# Patient Record
Sex: FEMALE | Race: OTHER
[De-identification: names, ages, dates, MRNs, and addresses within clinical notes are randomized per-mention and may not be internally consistent; named-entity substitution may affect disease eponyms.]

---

## 2019-03-05 PROBLEM — Z00.00 ENCOUNTER FOR PREVENTIVE HEALTH EXAMINATION: Status: ACTIVE | Noted: 2019-03-05

## 2019-03-07 ENCOUNTER — APPOINTMENT (OUTPATIENT)
Dept: OTOLARYNGOLOGY | Facility: CLINIC | Age: 74
End: 2019-03-07

## 2019-03-22 ENCOUNTER — APPOINTMENT (OUTPATIENT)
Dept: OTOLARYNGOLOGY | Facility: CLINIC | Age: 74
End: 2019-03-22
Payer: SELF-PAY

## 2019-03-22 PROCEDURE — V5299A: CUSTOM | Mod: NC

## 2019-09-05 ENCOUNTER — APPOINTMENT (OUTPATIENT)
Dept: OTOLARYNGOLOGY | Facility: CLINIC | Age: 74
End: 2019-09-05

## 2019-09-16 ENCOUNTER — APPOINTMENT (OUTPATIENT)
Dept: OTOLARYNGOLOGY | Facility: CLINIC | Age: 74
End: 2019-09-16
Payer: SELF-PAY

## 2019-09-16 PROCEDURE — 92593: CPT | Mod: NC

## 2019-10-04 ENCOUNTER — APPOINTMENT (OUTPATIENT)
Dept: OTOLARYNGOLOGY | Facility: CLINIC | Age: 74
End: 2019-10-04
Payer: COMMERCIAL

## 2019-10-04 VITALS
BODY MASS INDEX: 28.87 KG/M2 | WEIGHT: 165 LBS | DIASTOLIC BLOOD PRESSURE: 81 MMHG | SYSTOLIC BLOOD PRESSURE: 112 MMHG | HEIGHT: 63.5 IN | HEART RATE: 77 BPM

## 2019-10-04 DIAGNOSIS — Z82.49 FAMILY HISTORY OF ISCHEMIC HEART DISEASE AND OTHER DISEASES OF THE CIRCULATORY SYSTEM: ICD-10-CM

## 2019-10-04 DIAGNOSIS — Z78.9 OTHER SPECIFIED HEALTH STATUS: ICD-10-CM

## 2019-10-04 DIAGNOSIS — H61.23 IMPACTED CERUMEN, BILATERAL: ICD-10-CM

## 2019-10-04 DIAGNOSIS — Z87.891 PERSONAL HISTORY OF NICOTINE DEPENDENCE: ICD-10-CM

## 2019-10-04 DIAGNOSIS — Z87.09 PERSONAL HISTORY OF OTHER DISEASES OF THE RESPIRATORY SYSTEM: ICD-10-CM

## 2019-10-04 DIAGNOSIS — Z82.3 FAMILY HISTORY OF STROKE: ICD-10-CM

## 2019-10-04 PROCEDURE — V5010 ASSESSMENT FOR HEARING AID: CPT | Mod: NC

## 2019-10-04 PROCEDURE — 92557 COMPREHENSIVE HEARING TEST: CPT

## 2019-10-04 PROCEDURE — 99213 OFFICE O/P EST LOW 20 MIN: CPT | Mod: 25

## 2019-10-04 PROCEDURE — 69210 REMOVE IMPACTED EAR WAX UNI: CPT

## 2019-10-04 PROCEDURE — 92567 TYMPANOMETRY: CPT

## 2019-10-04 RX ORDER — LOSARTAN POTASSIUM 100 MG/1
TABLET, FILM COATED ORAL
Refills: 0 | Status: ACTIVE | COMMUNITY

## 2019-10-04 RX ORDER — ATORVASTATIN CALCIUM 80 MG/1
TABLET, FILM COATED ORAL
Refills: 0 | Status: ACTIVE | COMMUNITY

## 2019-10-04 NOTE — ASSESSMENT
[FreeTextEntry1] : It was my impression that the patient had a cerumen impaction that was cleared.  I recommended topical moisturizing.  I recommended avoiding Q-tips.  I reviewed aural hygiene and the role of cerumen with the patient.  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

## 2019-10-04 NOTE — HISTORY OF PRESENT ILLNESS
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 Patient reports of decreased in hearing, even while using bilateral amplification. Patient complains of some intermittent ear itching. No Other ENT issues reported at this visit.

## 2019-10-04 NOTE — REVIEW OF SYSTEMS
[Seasonal Allergies] : seasonal allergies [Red Eyes] : red eyes [Ear Itch] : ear itch [Eyes Itch] : itching of the eyes [Heartburn] : heartburn [Negative] : Endocrine

## 2019-10-04 NOTE — CONSULT LETTER
Message   Recorded as Task   Date: 08/26/2016 02:36 PM, Created By: [address]   Task Name: Care Coordination   Assigned To: [address] [facility] [facility] Clinical,Team   Regarding Patient: Bing Jerez, Status: In Progress   Comment:    Dipak Celaya - 26 Aug 2016 2:36 PM     TASK CREATED  8/22/16 UDT showed presence of alcohol  This is an opioid treatment agreement violation  Please remind patient not to drink alcohol while taking opioid medications  Any further opioid treatment agreement violations will result in me no longer prescribing those medications for him  So Hernandez - 29 Aug 2016 9:04 AM     TASK EDITED       Lmom for pt to return call  So Hernandez - 30 Aug 2016 11:45 AM     TASK EDITED  Called pt back informed him I was calling regarding his UDS  Pt states that soemthing is wrong because he does not drink alcohol  He is willing to return to the office to give another sample  Pt adamant that he does not drink alcohol  Pt lmom stating everything is fine from his injection,don't bother calling back  [address] - 06 Sep 2016 7:40 AM     TASK REPLIED TO: Previously Assigned To Autumn Heaton - 06 Sep 2016 7:44 AM     TASK IN PROGRESS        Active Problems    1  Analgesic use (V58 69) (Z79 899)   2  Chronic low back pain (724 2,338 29) (M54 5,G89 29)   3  Chronic obstructive pulmonary disease (496) (J44 9)   4  Chronic pain syndrome (338 4) (G89 4)   5  Cigarette nicotine dependence with nicotine-induced disorder (292 9) (F17 219)   6  Dyspnea on exertion (786 09) (R06 09)   7  Hiatal hernia (553 3) (K44 9)   8  Hyperlipidemia (272 4) (E78 5)   9  Hypertension (401 9) (I10)   10  Hypoxemia requiring supplemental oxygen (799 02) (R09 02,Z99 81)   11  Lumbar facet arthropathy (721 3) (M12 88)   12  Morbid or severe obesity due to excess calories (278 01) (E66 01)   13  Nocturnal hypoxemia (327 24)   14  Obstructive sleep apnea (327 23) (G47 33)   15  Pleural effusion (511 9) (J90)   16  Sacroiliitis (720 2) (M46 1)    Current Meds   1  Albuterol Sulfate (2 5 MG/3ML) 0 083% Inhalation Nebulization Solution; USE 1 UNIT   DOSE IN NEBULIZER 4 TIMES DAILY; Therapy: 53NNT7472 to (Evaluate:30Jan2017)  Requested for: 00Myb6577; Last   Rx:[id number] Ordered   2  Aspirin Low Dose 81 MG TABS; Therapy: (Recorded:12Gkz4910) to Recorded   3  Bystolic 10 MG Oral Tablet; Therapy: (Recorded:93Eie9325) to Recorded   4  Combivent AERO; Therapy: (([phone]) to Recorded   5  Diltiazem HCl 240 MG CP24;   Therapy: (Recorded:24Mls6492) to Recorded   6  Flovent  MCG/ACT Inhalation Aerosol; INHALE 2 PUFFS Every twelve hours; Therapy: 15MTB1591 to (Evaluate:10Feb2017)  Requested for: 18DLA9154; Last   Rx:99Uli7468 Ordered   7  Hydrocodone-Acetaminophen  MG Oral Tablet (Norco); TAKE 1 TABLET 3 times   daily PRN pain; Therapy: 27BKI9999 to (Evaluate:62Dsd3680); Last Rx:69Deq6118 Ordered   8  Incruse Ellipta 62 5 MCG/INH Inhalation Aerosol Powder Breath Activated; Therapy: (Recorded:90Rml7485) to Recorded   9  Lipitor 40 MG Oral Tablet (Atorvastatin Calcium); Therapy: (733 162 319) to Recorded   10  Omeprazole 20 MG Oral Capsule Delayed Release; Therapy: 91LDO6382 to Recorded   11  Plavix 75 MG Oral Tablet (Clopidogrel Bisulfate); Therapy: (75 196 772) to Recorded   12  Spiriva HandiHaler 18 MCG Inhalation Capsule; INHALE 1 CAPSULE Daily; Therapy: 73SYM8681 to (Evaluate:11Aug2017)  Requested for: 65Qji8249; Last    Rx:36Xjc7819 Ordered   13  Tamsulosin HCl 0 4 MG CAPS; Therapy: (75 196 772) to Recorded   14  Xarelto 20 MG Oral Tablet; Therapy: (Recorded:51Mds0864) to Recorded    Allergies    1   No Known Drug Allergies    Signatures   Electronically signed by : Dionne Martinez RN; Sep  6 2016  7:45AM EST                       (Author) [FreeTextEntry2] : KIEL DELANEY\par  [FreeTextEntry1] : \par \par Dear  Dr. KIEL DELANEY,\par \par I had the pleasure of seeing your patient today.  \par Please see my note below.\par \par \par Thank you very much for allowing me to participate in the care of your patient.\par \par Sincerely,\par \par \par Stef Kwong MD\par [facility]\par [facility]\par  [facility]\par \par

## 2019-10-04 NOTE — PHYSICAL EXAM
[FreeTextEntry1] : \par The patient was alert and oriented and in no distress.\par Voice was clear.\par \par Face:\par The patient had no facial asymmetry or mass.\par The skin was unremarkable.\par \par Eyes:\par The pupils were equal round and reactive to light and accommodation.\par There was no significant nystagmus or disconjugate gaze noted.\par \par Nose: \par The external nose had no significant deformity.  There was no facial tenderness.  On anterior rhinoscopy, the nasal mucosa was clear.  The anterior septum was deflected sharply to the right.  There were no visualized polyps purulence  or masses.\par \par Oral cavity:\par The oral mucosa was normal.\par The oral and base of tongue were clear and without mass.\par The gingival and buccal mucosa were moist and without lesions.\par The palate moved well.\par There was no cleft to the palate.\par There appeared to be good salivary flow.  \par There was no pus, erythema or mass in the oral cavity.\par \par \par Ears:\par  Procedure note:\par  Removal of Cerumen Impactions, both ears:  80510-46\par Both external ears were normal.\par There were symptomatic cerumen impactions in both ears.  These were cleared microscopically without trauma using both suction and curettes.  After clearing,  both ear canals were clear both eardrums were intact and mobile.  She had dry flaking skin of the ear canals\par Neck: \par The neck was symmetrical.\par The parotid and submandibular glands were normal without masses.\par The trachea was midline and there was no unusual crepitus.\par The thyroid was smooth and nontender and no masses were palpated.\par There was no significant cervical adenopathy.\par \par \par Neuro:\par Neurologically, the patient was awake, alert, and oriented to person, place and time. There were no obvious focal neurologic abnormalities.  Cranial nerves II through XII were grossly intact.\par \par \par TMJ:\par The temporomandibular joints were nontender.\par There was no abnormal crepitus and no significant malocclusion\par

## 2019-10-23 ENCOUNTER — APPOINTMENT (OUTPATIENT)
Dept: OTOLARYNGOLOGY | Facility: CLINIC | Age: 74
End: 2019-10-23
Payer: SELF-PAY

## 2019-10-23 DIAGNOSIS — Z46.1 ENCOUNTER FOR FITTING AND ADJUSTMENT OF HEARING AID: ICD-10-CM

## 2019-10-23 PROCEDURE — V5254I: CUSTOM

## 2019-11-14 ENCOUNTER — APPOINTMENT (OUTPATIENT)
Dept: OTOLARYNGOLOGY | Facility: CLINIC | Age: 74
End: 2019-11-14
Payer: SELF-PAY

## 2019-11-14 PROCEDURE — 92593: CPT | Mod: NC

## 2020-05-13 ENCOUNTER — APPOINTMENT (OUTPATIENT)
Dept: OTOLARYNGOLOGY | Facility: CLINIC | Age: 75
End: 2020-05-13

## 2020-07-01 ENCOUNTER — APPOINTMENT (OUTPATIENT)
Dept: OTOLARYNGOLOGY | Facility: CLINIC | Age: 75
End: 2020-07-01
Payer: SELF-PAY

## 2020-07-01 PROCEDURE — 92593: CPT | Mod: NC

## 2020-07-20 ENCOUNTER — APPOINTMENT (OUTPATIENT)
Dept: OTOLARYNGOLOGY | Facility: CLINIC | Age: 75
End: 2020-07-20
Payer: SELF-PAY

## 2020-07-20 PROCEDURE — 92593: CPT | Mod: NC

## 2021-01-12 ENCOUNTER — EMERGENCY (EMERGENCY)
Facility: HOSPITAL | Age: 76
LOS: 1 days | Discharge: HOME | End: 2021-01-12
Admitting: EMERGENCY MEDICINE
Payer: COMMERCIAL

## 2021-01-12 VITALS
TEMPERATURE: 98 F | DIASTOLIC BLOOD PRESSURE: 88 MMHG | HEART RATE: 76 BPM | OXYGEN SATURATION: 97 % | RESPIRATION RATE: 18 BRPM | SYSTOLIC BLOOD PRESSURE: 142 MMHG

## 2021-01-12 DIAGNOSIS — Z20.822 CONTACT WITH AND (SUSPECTED) EXPOSURE TO COVID-19: ICD-10-CM

## 2021-01-12 PROCEDURE — 99283 EMERGENCY DEPT VISIT LOW MDM: CPT

## 2021-01-12 NOTE — ED PROVIDER NOTE - OBJECTIVE STATEMENT
Patient presented requesting covid-19 testing. Patient asymptomatic - denies chest pain, dyspnea, fever, cough. denies recent travel. Pt had a possible exposure to a +COVID person,

## 2021-01-12 NOTE — ED PROVIDER NOTE - PATIENT PORTAL LINK FT
You can access the FollowMyHealth Patient Portal offered by [facility] by registering at the following website: [email or web address]. By joining [employer]’s FollowMyHealth portal, you will also be able to view your health information using other applications (apps) compatible with our system.

## 2021-01-15 ENCOUNTER — TRANSCRIPTION ENCOUNTER (OUTPATIENT)
Age: 76
End: 2021-01-15

## 2021-01-15 ENCOUNTER — EMERGENCY (EMERGENCY)
Facility: HOSPITAL | Age: 76
LOS: 1 days | Discharge: HOME | End: 2021-01-15
Attending: EMERGENCY MEDICINE | Admitting: EMERGENCY MEDICINE
Payer: COMMERCIAL

## 2021-01-15 VITALS
WEIGHT: 169.98 LBS | SYSTOLIC BLOOD PRESSURE: 128 MMHG | OXYGEN SATURATION: 98 % | RESPIRATION RATE: 18 BRPM | HEIGHT: 63 IN | HEART RATE: 77 BPM | DIASTOLIC BLOOD PRESSURE: 73 MMHG | TEMPERATURE: 98 F

## 2021-01-15 DIAGNOSIS — Z20.822 CONTACT WITH AND (SUSPECTED) EXPOSURE TO COVID-19: ICD-10-CM

## 2021-01-15 PROCEDURE — 99283 EMERGENCY DEPT VISIT LOW MDM: CPT

## 2021-01-15 NOTE — ED ADULT TRIAGE NOTE - CHIEF COMPLAINT QUOTE
Pt requesting COVID testing after known exposure on 1/10. Pt asymptomatic at this time with no recent travel.

## 2021-01-15 NOTE — ED PROVIDER NOTE - OBJECTIVE STATEMENT
74 y/o F presents to the ED requesting to have testing done for COVID-19. Pt endorses she is asymptomatic at this time. Pt denies fevers, chills, coughs, CP, and SOB.

## 2021-01-15 NOTE — ED PROVIDER NOTE - PATIENT PORTAL LINK FT
You can access the FollowMyHealth Patient Portal offered by [facility] by registering at the following website: [email or web address]. By joining [employer]’s FollowMyHealth portal, you will also be able to view your health information using other applications (apps) compatible with our system.

## 2021-01-16 LAB — SARS-COV-2 RNA SPEC QL NAA+PROBE: SIGNIFICANT CHANGE UP

## 2021-10-18 PROBLEM — Z78.9 OTHER SPECIFIED HEALTH STATUS: Chronic | Status: ACTIVE | Noted: 2021-01-15

## 2021-10-27 ENCOUNTER — APPOINTMENT (OUTPATIENT)
Dept: OTOLARYNGOLOGY | Facility: CLINIC | Age: 76
End: 2021-10-27
Payer: COMMERCIAL

## 2021-10-27 VITALS — BODY MASS INDEX: 29.23 KG/M2 | WEIGHT: 165 LBS | HEIGHT: 63 IN | TEMPERATURE: 98 F

## 2021-10-27 DIAGNOSIS — J34.2 DEVIATED NASAL SEPTUM: ICD-10-CM

## 2021-10-27 DIAGNOSIS — J30.0 VASOMOTOR RHINITIS: ICD-10-CM

## 2021-10-27 DIAGNOSIS — H90.3 SENSORINEURAL HEARING LOSS, BILATERAL: ICD-10-CM

## 2021-10-27 PROCEDURE — 92567 TYMPANOMETRY: CPT

## 2021-10-27 PROCEDURE — 99213 OFFICE O/P EST LOW 20 MIN: CPT

## 2021-10-27 PROCEDURE — 92557 COMPREHENSIVE HEARING TEST: CPT

## 2021-10-27 NOTE — HISTORY OF PRESENT ILLNESS
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  She is not sure if it is cerumen, her hearing or her aids and comes in for repeat evaluation.  Other than for some watery nasal discharge the patient had no other ear nose or throat complaints at this visit.

## 2021-10-27 NOTE — REASON FOR VISIT
[Subsequent Evaluation] : a subsequent evaluation for [FreeTextEntry2] : hearing loss and worsening in hearing even with hearing aids

## 2021-10-27 NOTE — CONSULT LETTER
[FreeTextEntry2] : KIEL DELANEY\par  [FreeTextEntry1] : \par \par Dear  Dr. KIEL DELANEY,\par \par I had the pleasure of seeing your patient today.  \par Please see my note below.\par \par \par Thank you very much for allowing me to participate in the care of your patient.\par \par Sincerely,\par \par [place]\par \par \par Stef Kwong MD\par [facility]\par [facility]\par  [facility]\par \par

## 2021-10-27 NOTE — ASSESSMENT
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 I did not just intervention as it does not seem that bothersome at this time.\par She has a history of sensory neural hearing losses and has been using hearing aids but feels that her hearing has gotten worse. I reassured  Flex that there was no significant cerumen impaction or middle ear disease\par Her hearing also is unchanged and I reviewed this with her. I could not tell if her feeling of increased hearing loss was due to her hearing aids and  I suggest having them rechecked or  whether this was due to a masked environment and this was discussed

## 2021-10-27 NOTE — PHYSICAL EXAM
[FreeTextEntry1] : \par The patient was alert and oriented and in no distress.\par Voice was clear.\par \par Face:\par The patient had no facial asymmetry or mass.\par The skin was unremarkable.\par \par Eyes:\par The pupils were equal round and reactive to light and accommodation.\par There was no significant nystagmus or disconjugate gaze noted.\par \par Nose: \par The external nose had no significant deformity.  There was no facial tenderness.  On anterior rhinoscopy, the nasal mucosa was clear.  The anterior septum was moderately deflected with flat watery nasal mucosa.  There were no visualized polyps purulence  or masses.\par \par Oral cavity:\par The oral mucosa was normal.\par The oral and base of tongue were clear and without mass.\par The gingival and buccal mucosa were moist and without lesions.\par The palate moved well.\par There was no cleft to the palate.\par There appeared to be good salivary flow.  \par There was no pus, erythema or mass in the oral cavity.\par \par \par Ears:\par The external ears were normal without deformity.\par The ear canals were clear.\par The tympanic membranes were intact and normal.\par \par Neck: \par The neck was symmetrical.\par The parotid and submandibular glands were normal without masses.\par The trachea was midline and there was no unusual crepitus.\par The thyroid was smooth and nontender and no masses were palpated.\par There was no significant cervical adenopathy.\par \par \par Neuro:\par Neurologically, the patient was awake, alert, and oriented to person, place and time. There were no obvious focal neurologic abnormalities.  Cranial nerves II through XII were grossly intact.\par \par \par TMJ:\par The temporomandibular joints were nontender.\par There was no abnormal crepitus and no significant malocclusion\par \par  [de-identified] : A complete audiometric evaluation was ordered done and compared to her previous. This showed a significant dishpan  configuration sensorineural hearing loss, unchanged from 2 years ago

## 2021-11-03 ENCOUNTER — APPOINTMENT (OUTPATIENT)
Dept: OTOLARYNGOLOGY | Facility: CLINIC | Age: 76
End: 2021-11-03
Payer: SELF-PAY

## 2021-11-03 DIAGNOSIS — Z71.89 OTHER SPECIFIED COUNSELING: ICD-10-CM

## 2021-11-03 PROCEDURE — V5010 ASSESSMENT FOR HEARING AID: CPT

## 2021-11-15 ENCOUNTER — APPOINTMENT (OUTPATIENT)
Dept: OTOLARYNGOLOGY | Facility: CLINIC | Age: 76
End: 2021-11-15
Payer: SELF-PAY

## 2021-11-15 PROCEDURE — V5261I: CUSTOM

## 2021-12-08 ENCOUNTER — APPOINTMENT (OUTPATIENT)
Dept: OTOLARYNGOLOGY | Facility: CLINIC | Age: 76
End: 2021-12-08
Payer: SELF-PAY

## 2021-12-08 DIAGNOSIS — Z46.1 ENCOUNTER FOR FITTING AND ADJUSTMENT OF HEARING AID: ICD-10-CM

## 2021-12-08 PROCEDURE — 92593: CPT | Mod: NC

## 2022-05-11 ENCOUNTER — APPOINTMENT (OUTPATIENT)
Dept: OTOLARYNGOLOGY | Facility: CLINIC | Age: 77
End: 2022-05-11

## 2023-09-18 NOTE — ED ADULT TRIAGE NOTE - BMI (KG/M2)
30.1 Wartpeel Counseling:  I discussed with the patient the risks of Wartpeel including but not limited to erythema, scaling, itching, weeping, crusting, and pain.

## 2024-05-23 ENCOUNTER — APPOINTMENT (OUTPATIENT)
Dept: OTOLARYNGOLOGY | Facility: CLINIC | Age: 79
End: 2024-05-23